# Patient Record
Sex: MALE | Race: WHITE | NOT HISPANIC OR LATINO | Employment: OTHER | URBAN - METROPOLITAN AREA
[De-identification: names, ages, dates, MRNs, and addresses within clinical notes are randomized per-mention and may not be internally consistent; named-entity substitution may affect disease eponyms.]

---

## 2019-03-26 PROBLEM — I50.9 CONGESTIVE HEART FAILURE: Status: ACTIVE | Noted: 2019-03-26

## 2019-03-26 PROBLEM — I27.20 PULMONARY HTN: Status: ACTIVE | Noted: 2019-03-26

## 2019-04-25 PROBLEM — I50.32 CHRONIC DIASTOLIC CONGESTIVE HEART FAILURE: Status: ACTIVE | Noted: 2019-03-26

## 2019-06-10 PROBLEM — I50.42 CHRONIC COMBINED SYSTOLIC AND DIASTOLIC CONGESTIVE HEART FAILURE: Status: ACTIVE | Noted: 2019-03-26

## 2019-07-05 ENCOUNTER — TELEPHONE (OUTPATIENT)
Dept: FAMILY MEDICINE | Facility: CLINIC | Age: 69
End: 2019-07-05

## 2019-07-05 NOTE — TELEPHONE ENCOUNTER
Spoke with patient regarding handicap tag form. He has not seen you as a patient only Dr. Moreno. Please advise if you can complete this form.

## 2019-07-05 NOTE — TELEPHONE ENCOUNTER
----- Message from Rainer Brown sent at 7/5/2019 12:49 PM CDT -----  Contact: self   Patient want to know if you can fill out a handicap form for driving patient will establish care September 16th patient will need form before appointment please call back at [phone] or [phone]

## 2019-08-30 ENCOUNTER — PATIENT OUTREACH (OUTPATIENT)
Dept: ADMINISTRATIVE | Facility: HOSPITAL | Age: 69
End: 2019-08-30

## 2019-08-30 NOTE — LETTER
August 30, 2019    Ruddy Ngo  [id number] [address] [facility] [address]             [facility]  [address]  [facility] [address]  Phone: [phone] Dear Mr. Ngo:    [facility] is committed to your overall health.  To help you get the most out of each of your visits, we will review your information to make sure you are up to date on all of your recommended tests and/or procedures.      Dr. Cevallos has found that your chart shows you may be due for the following:    One-time Hepatitis C Screening lab test(a viral condition that can harm the liver)  Diabetic Foot Exam  Annual Dilated Eye Exam  Tetanus Immunization  Shingles Immunization  Colon cancer screening  Pneumonia Immunization  Diabetic lab testing    If you have had any of the above done at another facility, please bring the records with you or Fax them to [phone] so that your record at [facility] will be complete. If you have not had any of these tests or procedures done recently and would like to complete this testing ,  please call [phone] or send a message through your [facility] portal to your provider's office.     If you have an upcoming scheduled appointment for the above test and/or procedures, please disregard this letter.    If you are currently taking medication, please bring it with you to your appointment for review.    Sincerely,  Shama Martin, Care Coordinator  [facility]  Phone: [phone]  Fax: [phone]

## 2019-08-30 NOTE — PROGRESS NOTES
Health Maintenance Due   Topic Date Due    Hepatitis C Screening  1950    Foot Exam  10/01/1961    Eye Exam  10/01/1961    TETANUS VACCINE  10/01/1969    Shingles Vaccine (1 of 2) 10/01/2001    Colonoscopy  10/01/2001    Pneumococcal Vaccine (65+ Low/Medium Risk) (1 of 2 - PCV13) 10/01/2016    Abdominal Aortic Aneurysm Screening  10/01/2016    Hemoglobin A1c  05/13/2019     Pre-visit outreach via mail

## 2020-08-28 PROBLEM — I48.92 PAROXYSMAL ATRIAL FLUTTER: Status: ACTIVE | Noted: 2020-08-28

## 2021-02-01 PROBLEM — Z79.01 LONG TERM (CURRENT) USE OF ANTICOAGULANTS: Status: ACTIVE | Noted: 2021-02-01

## 2021-03-22 PROBLEM — I95.9 HYPOTENSION: Status: RESOLVED | Noted: 2021-03-22 | Resolved: 2021-03-22

## 2021-03-22 PROBLEM — N17.9 AKI (ACUTE KIDNEY INJURY): Status: ACTIVE | Noted: 2021-03-22

## 2021-03-22 PROBLEM — I95.9 HYPOTENSION (ARTERIAL): Status: ACTIVE | Noted: 2021-03-22

## 2021-03-22 PROBLEM — I95.9 HYPOTENSION: Status: ACTIVE | Noted: 2021-03-22

## 2021-03-27 PROBLEM — R74.01 ELEVATION OF LEVELS OF LIVER TRANSAMINASE LEVELS: Status: ACTIVE | Noted: 2021-03-27

## 2021-03-28 PROBLEM — I13.10 CARDIORENAL SYNDROME: Status: ACTIVE | Noted: 2021-03-28

## 2021-04-13 PROBLEM — K76.1 CHRONIC PASSIVE HEPATIC CONGESTION: Status: ACTIVE | Noted: 2021-04-13

## 2021-04-13 PROBLEM — R57.0 CARDIOGENIC SHOCK: Status: ACTIVE | Noted: 2021-04-13

## 2021-04-13 PROBLEM — R74.01 TRANSAMINITIS: Status: ACTIVE | Noted: 2021-04-13

## 2021-04-13 PROBLEM — M79.89 LEG SWELLING: Status: ACTIVE | Noted: 2021-04-13

## 2021-04-13 PROBLEM — Z71.89 ACP (ADVANCE CARE PLANNING): Status: ACTIVE | Noted: 2021-04-13

## 2021-04-13 PROBLEM — I50.43 ACUTE ON CHRONIC COMBINED SYSTOLIC AND DIASTOLIC HEART FAILURE: Status: ACTIVE | Noted: 2019-03-26

## 2021-04-13 PROBLEM — I13.10 CARDIORENAL SYNDROME WITH RENAL FAILURE: Status: ACTIVE | Noted: 2021-04-13

## 2021-04-14 PROBLEM — R60.0 PERIPHERAL EDEMA: Status: ACTIVE | Noted: 2021-04-14

## 2021-04-22 PROBLEM — I13.10 CARDIORENAL SYNDROME: Status: ACTIVE | Noted: 2021-04-22

## 2021-04-24 PROBLEM — R57.0 CARDIOGENIC SHOCK: Status: RESOLVED | Noted: 2021-04-13 | Resolved: 2021-04-24

## 2021-04-24 PROBLEM — I13.10 CARDIORENAL SYNDROME: Status: RESOLVED | Noted: 2021-04-22 | Resolved: 2021-04-24

## 2021-04-25 PROBLEM — R53.81 PHYSICAL DEBILITY: Status: ACTIVE | Noted: 2021-04-25

## 2021-05-06 ENCOUNTER — OFFICE VISIT (OUTPATIENT)
Dept: NEPHROLOGY | Facility: CLINIC | Age: 71
End: 2021-05-06
Payer: MEDICARE

## 2021-05-06 VITALS — BODY MASS INDEX: 34.3 KG/M2 | WEIGHT: 219 LBS | SYSTOLIC BLOOD PRESSURE: 109 MMHG | DIASTOLIC BLOOD PRESSURE: 63 MMHG

## 2021-05-06 DIAGNOSIS — I26.09: ICD-10-CM

## 2021-05-06 DIAGNOSIS — I50.43 ACUTE ON CHRONIC COMBINED SYSTOLIC AND DIASTOLIC HEART FAILURE: ICD-10-CM

## 2021-05-06 DIAGNOSIS — I13.10 CARDIORENAL SYNDROME WITH RENAL FAILURE: ICD-10-CM

## 2021-05-06 DIAGNOSIS — E11.65 UNCONTROLLED TYPE 2 DIABETES MELLITUS WITH HYPERGLYCEMIA: ICD-10-CM

## 2021-05-06 DIAGNOSIS — K76.1 CHRONIC PASSIVE HEPATIC CONGESTION: ICD-10-CM

## 2021-05-06 DIAGNOSIS — N17.9 AKI (ACUTE KIDNEY INJURY): Primary | ICD-10-CM

## 2021-05-06 DIAGNOSIS — N18.32 STAGE 3B CHRONIC KIDNEY DISEASE: ICD-10-CM

## 2021-05-06 PROCEDURE — 1159F PR MEDICATION LIST DOCUMENTED IN MEDICAL RECORD: ICD-10-PCS | Mod: ,,, | Performed by: INTERNAL MEDICINE

## 2021-05-06 PROCEDURE — 3008F PR BODY MASS INDEX (BMI) DOCUMENTED: ICD-10-PCS | Mod: CPTII,,, | Performed by: INTERNAL MEDICINE

## 2021-05-06 PROCEDURE — 3051F PR MOST RECENT HEMOGLOBIN A1C LEVEL 7.0 - < 8.0%: ICD-10-PCS | Mod: CPTII,,, | Performed by: INTERNAL MEDICINE

## 2021-05-06 PROCEDURE — 99214 OFFICE O/P EST MOD 30 MIN: CPT | Mod: 95,,, | Performed by: INTERNAL MEDICINE

## 2021-05-06 PROCEDURE — 1157F PR ADVANCE CARE PLAN OR EQUIV PRESENT IN MEDICAL RECORD: ICD-10-PCS | Mod: ,,, | Performed by: INTERNAL MEDICINE

## 2021-05-06 PROCEDURE — 99214 PR OFFICE/OUTPT VISIT, EST, LEVL IV, 30-39 MIN: ICD-10-PCS | Mod: 95,,, | Performed by: INTERNAL MEDICINE

## 2021-05-06 PROCEDURE — 3008F BODY MASS INDEX DOCD: CPT | Mod: CPTII,,, | Performed by: INTERNAL MEDICINE

## 2021-05-06 PROCEDURE — 3051F HG A1C>EQUAL 7.0%<8.0%: CPT | Mod: CPTII,,, | Performed by: INTERNAL MEDICINE

## 2021-05-06 PROCEDURE — 1159F MED LIST DOCD IN RCRD: CPT | Mod: ,,, | Performed by: INTERNAL MEDICINE

## 2021-05-06 PROCEDURE — 1157F ADVNC CARE PLAN IN RCRD: CPT | Mod: ,,, | Performed by: INTERNAL MEDICINE
